# Patient Record
Sex: MALE | Race: WHITE | URBAN - METROPOLITAN AREA
[De-identification: names, ages, dates, MRNs, and addresses within clinical notes are randomized per-mention and may not be internally consistent; named-entity substitution may affect disease eponyms.]

---

## 2022-04-09 ENCOUNTER — HISTORICAL (OUTPATIENT)
Dept: ADMINISTRATIVE | Facility: HOSPITAL | Age: 57
End: 2022-04-09

## 2022-04-27 VITALS
OXYGEN SATURATION: 97 % | SYSTOLIC BLOOD PRESSURE: 133 MMHG | DIASTOLIC BLOOD PRESSURE: 82 MMHG | HEIGHT: 74 IN | WEIGHT: 252 LBS | BODY MASS INDEX: 32.34 KG/M2

## 2024-05-31 ENCOUNTER — TELEPHONE (OUTPATIENT)
Dept: NEUROLOGY | Facility: CLINIC | Age: 59
End: 2024-05-31

## 2024-06-03 DIAGNOSIS — R56.9 SEIZURE: Primary | ICD-10-CM

## 2024-06-26 ENCOUNTER — OFFICE VISIT (OUTPATIENT)
Dept: NEUROLOGY | Facility: CLINIC | Age: 59
End: 2024-06-26
Payer: COMMERCIAL

## 2024-06-26 VITALS
DIASTOLIC BLOOD PRESSURE: 86 MMHG | SYSTOLIC BLOOD PRESSURE: 126 MMHG | WEIGHT: 290 LBS | BODY MASS INDEX: 37.22 KG/M2 | HEIGHT: 74 IN

## 2024-06-26 DIAGNOSIS — R56.9 SEIZURE: ICD-10-CM

## 2024-06-26 DIAGNOSIS — Z87.820 H/O TRAUMATIC BRAIN INJURY: ICD-10-CM

## 2024-06-26 DIAGNOSIS — G40.009 LOCALIZATION-RELATED (FOCAL) (PARTIAL) IDIOPATHIC EPILEPSY AND EPILEPTIC SYNDROMES WITH SEIZURES OF LOCALIZED ONSET, NOT INTRACTABLE, WITHOUT STATUS EPILEPTICUS: Primary | ICD-10-CM

## 2024-06-26 PROCEDURE — 99205 OFFICE O/P NEW HI 60 MIN: CPT | Mod: S$GLB,,, | Performed by: SPECIALIST

## 2024-06-26 PROCEDURE — 3079F DIAST BP 80-89 MM HG: CPT | Mod: CPTII,S$GLB,, | Performed by: SPECIALIST

## 2024-06-26 PROCEDURE — 3008F BODY MASS INDEX DOCD: CPT | Mod: CPTII,S$GLB,, | Performed by: SPECIALIST

## 2024-06-26 PROCEDURE — 99999 PR PBB SHADOW E&M-EST. PATIENT-LVL III: CPT | Mod: PBBFAC,,, | Performed by: SPECIALIST

## 2024-06-26 PROCEDURE — 1159F MED LIST DOCD IN RCRD: CPT | Mod: CPTII,S$GLB,, | Performed by: SPECIALIST

## 2024-06-26 PROCEDURE — 3074F SYST BP LT 130 MM HG: CPT | Mod: CPTII,S$GLB,, | Performed by: SPECIALIST

## 2024-06-26 RX ORDER — ATORVASTATIN CALCIUM 20 MG/1
20 TABLET, FILM COATED ORAL NIGHTLY
COMMUNITY
Start: 2024-05-29

## 2024-06-26 RX ORDER — LAMOTRIGINE 100 MG/1
100 TABLET ORAL 2 TIMES DAILY
Qty: 180 TABLET | Refills: 3 | Status: SHIPPED | OUTPATIENT
Start: 2024-07-17 | End: 2025-07-17

## 2024-06-26 RX ORDER — LAMOTRIGINE 25 MG/1
25 TABLET ORAL SEE ADMIN INSTRUCTIONS
Qty: 84 TABLET | Refills: 0 | Status: SHIPPED | OUTPATIENT
Start: 2024-06-26

## 2024-06-26 RX ORDER — TADALAFIL 20 MG/1
20 TABLET ORAL DAILY
Qty: 30 TABLET | Refills: 11 | Status: SHIPPED | OUTPATIENT
Start: 2024-06-26 | End: 2025-06-26

## 2024-06-26 RX ORDER — LAMOTRIGINE 100 MG/1
100 TABLET ORAL 2 TIMES DAILY
Qty: 180 TABLET | Refills: 3 | Status: SHIPPED | OUTPATIENT
Start: 2024-06-26 | End: 2024-06-26 | Stop reason: SDUPTHER

## 2024-06-26 NOTE — PROGRESS NOTES
"Subjective:      @Patient ID: Abhay Duffy is a 58 y.o. male.    Chief Complaint/referral reason/HPI:   Chief Complaint   Patient presents with    New pt for sz eval     Here for sz eval    Pt reports was at work when he had an unwitnessed sz episode on 5/16/24; was found 30 min after episode happened, was unconscious when found, hit head on something during event. Tongue biting; confusion after; cannot recall anything that happened from the previous day. Some sz episodes since TBI in 1999, no sz meds since 2015. Evaluated at [facility] in [place]; CT Head done. No repeat sz since. Episodes of "rush" throughout body        Zeke MICHELE hx comments:  I'd seen him years back   Remote TBI SAH   Last seen 2016  He'd been on LTG for some time in the past         See also 'facesheet' under media for handwritten notes     Review of Systems         Marital status:     [x] Working     [] Retired, worked as:   [x] Drives     [] Does not drive     -------------------------------------    EVANS (obstructive sleep apnea)     ..  Current Outpatient Medications   Medication Instructions    atorvastatin (LIPITOR) 20 mg, Oral, Nightly      Objective:      Exam:   Visit Vitals  /86 (BP Location: Left arm, Patient Position: Sitting)   Ht 6' 2" (1.88 m)   Wt 131.5 kg (290 lb)   BMI 37.23 kg/m²     General Exam  If Accompanied, by__       body habitus_ Body mass index is 37.23 kg/m².  Gen exam  large hallie   RRR     Neurological:    Exam comments:  L spastic hemiparesis   VF EOM's ok        Neuroimaging: imaging reports reviewed. Rads summary: stef said normal head ct 2015  My comments:     Labs:    [x]  New Patient         []  Multiple Issues/ diagnoses or problems  [if not enumerated in note then discussed but not documented]    Complexity of Data:   [x] High    [] Moderate   [] Images and reports reviewed [x] History obtained from accompaniment  [] Studies ordered [] Studies consid or discussed, not ordered   [x] Differential " Diagnoses discussed       Risks:   [x] High     [] Moderate   [] (poss or def) neurodegenerative condition [] () autoimmune condition with possibility of flares or unexpected attack  [x] () seiz d.o. with possib of recurr seiz's  [] Cerebrovasc ds with risk of recurrent stroke  [] CNS meds (and/or) potentially high risk non CNS meds taken or discussed which may cause med or behav SE's  [] Fall risk [x] Driving discussed   he's aware unlawful to drive until seizure free 6 mos   [] Diagnosis unclear or DDx wide making risk uncertain   []:    MDM:    [x] High     [] Moderate       Assessment/Plan:         ICD-10-CM ICD-9-CM   1. Localization-related (focal) (partial) idiopathic epilepsy and epileptic syndromes with seizures of localized onset, not intractable, without status epilepticus  G40.009 345.50   2. H/O traumatic brain injury  Z87.820 V15.52   3. Seizure  R56.9 780.39         Other Comments / Follow Up:        Medications Ordered This Encounter   Medications    lamoTRIgine (LAMICTAL) 100 MG tablet     Sig: Take 1 tablet (100 mg total) by mouth 2 (two) times daily.     Dispense:  180 tablet     Refill:  3    lamoTRIgine (LAMICTAL) 25 MG tablet     Sig: Take 1 tablet (25 mg total) by mouth As instructed. One twice daily first week; two twice daily 2nd wk; 3 twice daily third week; then 100mg twice daily until further notice     Dispense:  84 tablet     Refill:  0    tadalafiL (CIALIS) 20 MG Tab     Sig: Take 1 tablet (20 mg total) by mouth once daily.     Dispense:  30 tablet     Refill:  11      No orders of the defined types were placed in this encounter.     Patient Instructions    Aim virtual visit 6 wks   I am willing to do FMLA paperwork for wife as she claimed intermittent leave to help him driving restriction etc       Himanshu Johnson MD MANASA FAAN, [facility]  Neuroscience Center Medical Director   [facility]

## 2024-08-20 ENCOUNTER — OFFICE VISIT (OUTPATIENT)
Dept: NEUROLOGY | Facility: CLINIC | Age: 59
End: 2024-08-20
Payer: COMMERCIAL

## 2024-08-20 DIAGNOSIS — G40.009 LOCALIZATION-RELATED (FOCAL) (PARTIAL) IDIOPATHIC EPILEPSY AND EPILEPTIC SYNDROMES WITH SEIZURES OF LOCALIZED ONSET, NOT INTRACTABLE, WITHOUT STATUS EPILEPTICUS: Primary | ICD-10-CM

## 2024-08-20 DIAGNOSIS — Z87.820 H/O TRAUMATIC BRAIN INJURY: ICD-10-CM

## 2024-08-20 PROCEDURE — 99214 OFFICE O/P EST MOD 30 MIN: CPT | Mod: 95,,, | Performed by: SPECIALIST

## 2024-08-20 RX ORDER — LAMOTRIGINE 100 MG/1
200 TABLET ORAL 2 TIMES DAILY
Qty: 360 TABLET | Refills: 3 | Status: SHIPPED | OUTPATIENT
Start: 2024-08-20 | End: 2025-08-20

## 2024-08-20 NOTE — PROGRESS NOTES
This is a telemedicine note. See bottom of note for boilerplate elements.     Abhay Duffy is a 58 y.o. male seen today via telemedicine visit.   Subjective:    Patient ID: Abhay Duffy is a 58 y.o. male.  Chief Complaint: virtual follow up for dx or symptoms: sz fu    HPI:         Taking 100 mg Lamictal twice per day.  Has had a couple of minor seizures that he refers to as halos seizures where he feels like he is falling into himself.    Tolerating the Lamictal fine    Current Outpatient Medications   Medication Instructions    atorvastatin (LIPITOR) 20 mg, Oral, Nightly    lamoTRIgine (LAMICTAL) 25 mg, Oral, See admin instructions, One twice daily first week; two twice daily 2nd wk; 3 twice daily third week; then 100mg twice daily until further notice    lamoTRIgine (LAMICTAL) 100 mg, Oral, 2 times daily    tadalafiL (CIALIS) 20 mg, Oral, Daily        Objective:      Exam Limited due to telemedicine restrictions.  There were no vitals taken for this visit.  if accompanied, by:_n  Speech:  Normal  Cranial nerves grossly normal    Neuroimaging:  History of TBI.  Head CT scan report from 2015 [facility] reviewed.  unremarkable exam of the brain woJak    Labs:    meds:      ___ multiple issues/ diagnoses or problems [if not enumerated in note then discussed in encounter but not documented]    complexity of data     __high _mod   __ images and reports reviewed __ hx obtained from family or accompaniment __studies ordered __   __studies considered or discussed but not ordered __ __DDx discussed    Risks    __high _mod   __ (possible or definite) neurodegenerative condition and inherent progression  __ (poss or def) autoimmune condition with possibility of flares or unexpected attack  __ (poss or def) seiz d.o. with possib of recurr seiz's   __ cerebrovasc ds with risk of recurrence of stroke  __ CNS meds (and/or) potentially high risk non CNS meds which may cause medical or behavioral side  effects  __ fall risk  __ driving discussed __ diagnosis unclear or DDx wide making risk uncertain to high  __other:    MDM/Medical Decision Making     __high  _ * moderate   Assessment/Plan:     Problem List Items Addressed This Visit          Neuro    Localization-related (focal) (partial) idiopathic epilepsy and epileptic syndromes with seizures of localized onset, not intractable, without status epilepticus - Primary    H/O traumatic brain injury       Other comments/ follow up:    Imaging orders (if any):   No orders of the defined types were placed in this encounter.     Medications Ordered This Encounter   Medications    lamoTRIgine (LAMICTAL) 100 MG tablet     Sig: Take 2 tablets (200 mg total) by mouth 2 (two) times daily.     Dispense:  360 tablet     Refill:  3   Advised he increase lamotrigine from 100 mg twice daily to 100 morning 200 nightly for 1 week and then 200 mg twice daily  If he gets a rash he is to stop the medicine call us   He may elect to stretch out the increase over more weeks than one depending on how he is feeling  Aim virtual follow up 3 months  He is aware unlawful to drive until seizure-free at least 6 months    Video Time Documentation:  Spent 5 minutes with patient over video discussing health concerns.   Total time this visit:    10  minutes    This is a telemedicine note.   Patient was treated using telemedicine, real time audio and video, according to [facility] protocols. This visit is not recorded.  IHimanshu MD, conducted the visit from the Neurology clinic of [facility]. The patient participated in the visit at a non-[facility] location selected by the patient, [facility].   I am licensed in [place] where the patient stated they are located. The patient stated that they understood and accepted the privacy and security risks to their information at their location.

## 2024-11-20 ENCOUNTER — OFFICE VISIT (OUTPATIENT)
Dept: NEUROLOGY | Facility: CLINIC | Age: 59
End: 2024-11-20
Payer: COMMERCIAL

## 2024-11-20 DIAGNOSIS — Z87.820 H/O TRAUMATIC BRAIN INJURY: ICD-10-CM

## 2024-11-20 DIAGNOSIS — G40.009 LOCALIZATION-RELATED (FOCAL) (PARTIAL) IDIOPATHIC EPILEPSY AND EPILEPTIC SYNDROMES WITH SEIZURES OF LOCALIZED ONSET, NOT INTRACTABLE, WITHOUT STATUS EPILEPTICUS: Primary | ICD-10-CM

## 2024-11-20 PROCEDURE — 99214 OFFICE O/P EST MOD 30 MIN: CPT | Mod: 95,,, | Performed by: SPECIALIST

## 2024-11-20 NOTE — PROGRESS NOTES
This is a telemedicine note. See bottom of note for boilerplate elements.     Abhay Duffy is a 59 y.o. male seen today via telemedicine visit.   Subjective:    Patient ID: Abhay Duffy is a 59 y.o. male.  Chief Complaint: virtual follow up for dx or symptoms: sz; remote TBI     HPI:         no seiz's  Struggles with HA's about once per week lasts hours no vis symptoms or nausea and pain is global and nonthrobbing   Also separately having GI issues  which may be in part triggered by caffeine or coffee he thinks     Current Outpatient Medications   Medication Instructions    atorvastatin (LIPITOR) 20 mg, Oral, Nightly    lamoTRIgine (LAMICTAL) 200 mg, Oral, 2 times daily    tadalafiL (CIALIS) 20 mg, Oral, Daily      Verif taking ltg 200mg bid   Objective:      Exam Limited due to telemedicine restrictions.  There were no vitals taken for this visit.  if accompanied, by:_ wife off camera   Speech: normal     Neuroimaging:  I did not have images when we interacted in recent past     Labs:    meds:    Complexity of Data:     [] High  [] Moderate   Risks:   [x] High   [] Moderate   MDM:      [] High   [x] Moderate     Considered imaging   ct cta  or mri mra   held off     Consider adding topir but HA's sound non migrainous so held off   Considered ch ltg but did not       Assessment/Plan:     Problem List Items Addressed This Visit          Neuro    Localization-related (focal) (partial) idiopathic epilepsy and epileptic syndromes with seizures of localized onset, not intractable, without status epilepticus - Primary    H/O traumatic brain injury       Other comments/ follow up:         Before or in lieu of ordering additional images I want to review the imaging he had in may at [facility]     Video Time Documentation:  Spent 8 minutes with patient over video discussing health concerns.   Total time this visit:    10  minutes  Aim revisit virtual in Febr       This is a telemedicine note.   Patient was treated using  telemedicine, real time audio and video, according to [facility] protocols. This visit is not recorded.  I, Himanshu Johnson MD, conducted the visit from the Neurology clinic of [facility]. The patient participated in the visit at a non-[facility] location selected by the patient, [facility].   I am licensed in [place] where the patient stated they are located. The patient stated that they understood and accepted the privacy and security risks to their information at their location.

## 2025-05-01 ENCOUNTER — OFFICE VISIT (OUTPATIENT)
Dept: NEUROLOGY | Facility: CLINIC | Age: 60
End: 2025-05-01
Payer: COMMERCIAL

## 2025-05-01 DIAGNOSIS — G40.009 LOCALIZATION-RELATED (FOCAL) (PARTIAL) IDIOPATHIC EPILEPSY AND EPILEPTIC SYNDROMES WITH SEIZURES OF LOCALIZED ONSET, NOT INTRACTABLE, WITHOUT STATUS EPILEPTICUS: Primary | ICD-10-CM

## 2025-05-01 DIAGNOSIS — Z87.820 H/O TRAUMATIC BRAIN INJURY: ICD-10-CM

## 2025-05-01 PROCEDURE — 98006 SYNCH AUDIO-VIDEO EST MOD 30: CPT | Mod: 95,,, | Performed by: SPECIALIST

## 2025-05-01 NOTE — PROGRESS NOTES
This is a telemedicine note. See bottom of note for boilerplate elements.     Abhay Duffy is a 59 y.o. male seen today via telemedicine visit.   Subjective:    Patient ID: Abhay Duffy is a 59 y.o. male.  Chief Complaint: virtual follow up for dx or symptoms: feeling ok lately      HPI:         taking 100mg bid and feels better at this dose   did not feel well at 200mg bid     Last seizure May 2024 (before LTG) and he was not on a seiz drug at all     Current Outpatient Medications   Medication Instructions    atorvastatin (LIPITOR) 20 mg, Oral, Nightly    lamoTRIgine (LAMICTAL) 200 mg, Oral, 2 times daily    tadalafiL (CIALIS) 20 mg, Oral, Daily        Objective:      Exam Limited due to telemedicine restrictions.  There were no vitals taken for this visit.  if accompanied, by:_ n  Speech: normal   CN's grossly ok over video     Neuroimaging:  Need to get prior ct from [facility] 2015 disc   rads had said unremarkable     Labs:    meds:      MDM:      [] High   [x] Moderate   Assessment/Plan:     Problem List Items Addressed This Visit          Neuro    Localization-related (focal) (partial) idiopathic epilepsy and epileptic syndromes with seizures of localized onset, not intractable, without status epilepticus - Primary    H/O traumatic brain injury       Other comments/ follow up:      Orders Placed This Encounter   Procedures    Lamotrigine Level    Am LTG level Jeri Wells please   6 mo revisit virtual            Video Time Documentation:  Spent 6 minutes with patient over video discussing health concerns.   Total time this visit:     10 minutes    This is a telemedicine note.   Patient was treated using telemedicine, real time audio and video, according to [facility] protocols. This visit is not recorded.  The patient participated in the visit at a non-[facility] location selected by the patient, [facility].   I am licensed in [place] where the patient stated they are located.